# Patient Record
(demographics unavailable — no encounter records)

---

## 2024-10-24 NOTE — REASON FOR VISIT
"Pt was walking dogs, fell on ice, hit back of head \"hard, hard, hard\".  No LOC.  Mild nausea.  Feels a \"little sleepy\". Pain 2/10.  Denies vision change. NAD. Denies neck pain.   " [_______] : [unfilled]

## 2024-10-26 NOTE — HISTORY OF PRESENT ILLNESS
[FreeTextEntry1] : The patient returned to the office stating that her toenails were hurting.  She stated that they feel better after being treated here.  The patient said it is sore on toes 1, 2, 3, 4 and 5 both feet.  The application of the medication was being done.  I adjusted her ankle brace by the fifth metatarsal head.

## 2024-10-26 NOTE — ASSESSMENT
[FreeTextEntry1] : Assessment: Onychomycosis caused by T. Rubrum.  Plan: I debrided each toenail via mechanical trimming and electrical grinding.  All toenails were trimmed with a 14 cm sterile stainless steel box lock double spring nail splitter.  Then utilizing a sterile pear shaped lilia ed (this device falls under bur, surgical, general & plastic surgery.  The FDA deems this item a Class-1 device) via a 35,000 RPM electric drill and vacuum and dust extractor system all toenails were aseptically debrided removing fungal layers.  This is done to diminish the fungal load of the toenails and enhance the effects of the antifungal medication, allowing overall improvement in the degree of fungal infection as well as improve appearance and reduce discomfort and help diminish chances of secondary bacterial infection, also lessening the chance of ingrown nails, especially when performed on a regular basis.  The patient was encouraged to continue with the topical antifungal medication everyday and use the Clean Sweep antifungal spray to disinfect their shoes. She was encouraged to call the office with any questions or problems as they may arise.  PTR: 2 months.

## 2024-12-04 NOTE — PROCEDURE
[de-identified] : I injected the patient's left knee with (Euflexxa) (Lot #[id number] )(exp 05/0625) for osteoarthritis Knee injection visco-supplementation: I discussed at length with the patient the planned steroid and lidocaine injection for primary osteoarthritis. The risks, benefits, convalescence and alternatives were reviewed and pt consented for injection. The possible side effects discussed included but were not limited to: pain, swelling, heat and redness. There symptoms are generally mild but if they are extensive then contact the office. Giving pain relievers by mouth such as NSAIDs or Tylenol can generally treat the reactions to injection. Rare cases of infection have been noted. Rash, hives and itching may occur post injection. If you have muscle pain or cramps, flushing and or swelling of the face, rapid heart beat, nausea, dizziness, fever, chills, headache, difficulty breathing, swelling in the arms or legs, or have a prickly feeling of your skin, contact a health care provider immediately. Following this discussion, the knee was prepped with alcohol and under sterile condition the injection was performed through a superolateral injection site with a 20 gauge needle. The needle was introduced into the joint, aspiration was performed to ensure intra-articular placement and the medication was injected. Upon withdrawal of the needle the site was cleaned with alcohol and a band aid applied. The patient tolerated the injection well and there were no adverse effects. Post injection instructions included no strenuous activity for 24 hours, cryotherapy and if there are any adverse effects to contact the office.  I injected the patient's right knee with (Euflexxa) (Lot #[id number] )(exp 05/0625) for osteoarthritis Knee injection visco-supplementation: I discussed at length with the patient the planned steroid and lidocaine injection for primary osteoarthritis. The risks, benefits, convalescence and alternatives were reviewed and pt consented for injection. The possible side effects discussed included but were not limited to: pain, swelling, heat and redness. There symptoms are generally mild but if they are extensive then contact the office. Giving pain relievers by mouth such as NSAIDs or Tylenol can generally treat the reactions to injection. Rare cases of infection have been noted. Rash, hives and itching may occur post injection. If you have muscle pain or cramps, flushing and or swelling of the face, rapid heart beat, nausea, dizziness, fever, chills, headache, difficulty breathing, swelling in the arms or legs, or have a prickly feeling of your skin, contact a health care provider immediately. Following this discussion, the knee was prepped with alcohol and under sterile condition the injection was performed through a superolateral injection site with a 20 gauge needle. The needle was introduced into the joint, aspiration was performed to ensure intra-articular placement and the medication was injected. Upon withdrawal of the needle the site was cleaned with alcohol and a band aid applied. The patient tolerated the injection well and there were no adverse effects. Post injection instructions included no strenuous activity for 24 hours, cryotherapy and if there are any adverse effects to contact the office.

## 2024-12-04 NOTE — PHYSICAL EXAM
[de-identified] : GENERAL APPEARANCE: Well nourished and hydrated, pleasant, alert, and oriented x 3. Appears their stated age. \par  HEENT: Normocephalic, extraocular eye motion intact. Nasal septum midline. Oral cavity clear. External auditory canal clear. \par  RESPIRATORY: Breath sounds clear and audible in all lobes. No wheezing, No accessory muscle use.\par  CARDIOVASCULAR: No apparent abnormalities. No lower leg edema. No varicosities. Pedal pulses are palpable.\par  NEUROLOGIC: Sensation is normal, no muscle weakness in the upper or lower extremities.\par  DERMATOLOGIC: No apparent skin lesions, moist, warm, no rash.\par  SPINE: Cervical spine appears normal and moves freely; thoracic spine appears normal and moves freely; lumbosacral spine appears normal and moves freely, normal, nontender.\par  MUSCULOSKELETAL: Hands, wrists, and elbows are normal and move freely, shoulders are normal and move freely. \par  Musculoskeletal\par  5/5 motor strength in bilateral lower extremities. Sensory: Intact in bilateral lower extremities. DTRs: Biceps, brachioradialis, triceps, patellar, ankle and plantar 2+ and symmetric bilaterally. Pulses: dorsalis pedis, posterior tibial, femoral, popliteal, and radial 2+ and symmetric bilaterally. \par  Constitutional: Alert and in no acute distress, but well-appearing. \par   [de-identified] : Bilateral knee examination shows a mild effusion she has no open wound range of motion is 0 to 120 degree with crepitus\par

## 2024-12-04 NOTE — DISCUSSION/SUMMARY
[de-identified] : Medication risks reviewed. 77 y/o F pt presents with moderate tricompartmental osteoarthritis of both knees. I discussed the nature of her condition and treatment options. The pt is not a candidate for a bilateral TKA. The pt's most recent MRI of both knees reveal full-thickness cartilage loss in the left knee specifically the patellofemoral joint the right knee is more reassuring MRI with a degenerative medial meniscus tear. I discussed exhausting conservative treatment such as cortisone injections, HA injections, PT, anti-inflammatories, and low impact exercise. The pt responded well to her last HA injections in December 2023. The pt is here for 1/3 Bilateral knee Euflexxa injections which she tolerated well. The pt will f/u in 1 week for 2/3 injection of both knees.

## 2024-12-04 NOTE — REASON FOR VISIT
[Follow-Up Visit] : a follow-up visit for [Other: ____] : [unfilled] [FreeTextEntry2] : Bilat knee Euflexxa #1 Lot [id number] exp 11/30/25

## 2024-12-04 NOTE — HISTORY OF PRESENT ILLNESS
[Pain Location] : pain [Stable] : stable [3] : a current pain level of 3/10 [de-identified] : 75 y/o F pt presents with bilateral knee pain. The pt has a hx of moderate tricompartmental osteoarthritis. The pt was last seen in December 2023 and had HA injections of both knees which gave her good relief. The pt is here for repeat q. 6 months HA injections. Her most recent MRI of both knees shows some areas of full-thickness cartilage loss in the left knee specifically the patellofemoral joint the right knee is more reassuring MRI with a degenerative medial meniscus tear. Pt notes that she is working on weight loss and loss a few pounds recently. She has a mhx of emphysema.     She states the symptoms are worsening. The patient mentions the symptoms started Several year(s) ago. Pain levels include a current pain level of 5/10, an average pain level of 5/10, a minimum pain level of 4/10 and a maximum pain level of 6/10.  Her symptoms occur while sitting. She states the symptoms seem to be constant.    Modifying factors - worsened by bending, worsened with knee flexion and worsened with knee extension. Relieving factors include not relieved by acetaminophen and not relieved by nonsteroidal anti-inflammatory drugs.   Associated Symptoms: no ataxia and no incontinence.

## 2024-12-04 NOTE — END OF VISIT
[FreeTextEntry3] : I, Ho Tapia, acted solely as a scribe for Dr. Cordell Beyer on this date 12/04/2024.  I, Cordell Beyer MD, personally performed the services described in the documentation, reviewed the documentation recorded by the scribe in my presence and it accurately and completely records my words and actions.

## 2024-12-18 NOTE — REASON FOR VISIT
[Follow-Up Visit] : a follow-up visit for [Other: ____] : [unfilled] [FreeTextEntry2] :  Bilat knee Euflexxa #2 Lot [id number] exp 11/30/25.

## 2024-12-18 NOTE — PROCEDURE
[de-identified] : I injected the patient's left knee with (Euflexxa) (Lot #[id number] )(exp 05/0625) for osteoarthritis Knee injection visco-supplementation: I discussed at length with the patient the planned steroid and lidocaine injection for primary osteoarthritis. The risks, benefits, convalescence and alternatives were reviewed and pt consented for injection. The possible side effects discussed included but were not limited to: pain, swelling, heat and redness. There symptoms are generally mild but if they are extensive then contact the office. Giving pain relievers by mouth such as NSAIDs or Tylenol can generally treat the reactions to injection. Rare cases of infection have been noted. Rash, hives and itching may occur post injection. If you have muscle pain or cramps, flushing and or swelling of the face, rapid heart beat, nausea, dizziness, fever, chills, headache, difficulty breathing, swelling in the arms or legs, or have a prickly feeling of your skin, contact a health care provider immediately. Following this discussion, the knee was prepped with alcohol and under sterile condition the injection was performed through a superolateral injection site with a 20 gauge needle. The needle was introduced into the joint, aspiration was performed to ensure intra-articular placement and the medication was injected. Upon withdrawal of the needle the site was cleaned with alcohol and a band aid applied. The patient tolerated the injection well and there were no adverse effects. Post injection instructions included no strenuous activity for 24 hours, cryotherapy and if there are any adverse effects to contact the office.  I injected the patient's right knee with (Euflexxa) (Lot #[id number] )(exp 05/0625) for osteoarthritis Knee injection visco-supplementation: I discussed at length with the patient the planned steroid and lidocaine injection for primary osteoarthritis. The risks, benefits, convalescence and alternatives were reviewed and pt consented for injection. The possible side effects discussed included but were not limited to: pain, swelling, heat and redness. There symptoms are generally mild but if they are extensive then contact the office. Giving pain relievers by mouth such as NSAIDs or Tylenol can generally treat the reactions to injection. Rare cases of infection have been noted. Rash, hives and itching may occur post injection. If you have muscle pain or cramps, flushing and or swelling of the face, rapid heart beat, nausea, dizziness, fever, chills, headache, difficulty breathing, swelling in the arms or legs, or have a prickly feeling of your skin, contact a health care provider immediately. Following this discussion, the knee was prepped with alcohol and under sterile condition the injection was performed through a superolateral injection site with a 20 gauge needle. The needle was introduced into the joint, aspiration was performed to ensure intra-articular placement and the medication was injected. Upon withdrawal of the needle the site was cleaned with alcohol and a band aid applied. The patient tolerated the injection well and there were no adverse effects. Post injection instructions included no strenuous activity for 24 hours, cryotherapy and if there are any adverse effects to contact the office.

## 2024-12-18 NOTE — DISCUSSION/SUMMARY
[de-identified] : Medication risks reviewed. 75 y/o F pt presents with moderate tricompartmental osteoarthritis of both knees. I discussed the nature of her condition and treatment options. The pt is not a candidate for a bilateral TKA. The pt's most recent MRI of both knees reveal full-thickness cartilage loss in the left knee specifically the patellofemoral joint the right knee is more reassuring MRI with a degenerative medial meniscus tear. I discussed exhausting conservative treatment such as cortisone injections, HA injections, PT, anti-inflammatories, and low impact exercise. The pt responded well to her last HA injections in December 2023. The pt is here for 2/3 Bilateral knee Euflexxa injections which she tolerated well. The pt will f/u in 1 week for 3/3 injection of both knees.

## 2024-12-18 NOTE — HISTORY OF PRESENT ILLNESS
[de-identified] : 77 y/o F pt presents with bilateral knee pain. She states her left knee discomfort is more than the right.  The pt has a hx of moderate tricompartmental osteoarthritis. The pt was last seen in December 2023 and had HA injections of both knees which gave her good relief. The pt is here for repeat q. 6 months HA injections. Her most recent MRI of both knees shows some areas of full-thickness cartilage loss in the left knee specifically the patellofemoral joint the right knee is more reassuring MRI with a degenerative medial meniscus tear. Pt notes that she is working on weight loss and loss a few pounds recently. She has a mhx of emphysema.     She states the symptoms are worsening. The patient mentions the symptoms started Several year(s) ago. Pain levels include a current pain level of 5/10, an average pain level of 5/10, a minimum pain level of 4/10 and a maximum pain level of 6/10.  Her symptoms occur while sitting. She states the symptoms seem to be constant.    Modifying factors - worsened by bending, worsened with knee flexion and worsened with knee extension. Relieving factors include not relieved by acetaminophen and not relieved by nonsteroidal anti-inflammatory drugs.   Associated Symptoms: no ataxia and no incontinence. [Pain Location] : pain [Stable] : stable [3] : a current pain level of 3/10

## 2024-12-18 NOTE — END OF VISIT
[FreeTextEntry3] : I, Ho Tapia, acted solely as a scribe for Dr. Cordell Beyer on this date 12/18/2024.  I, Cordell Beyer MD, personally performed the services described in the documentation, reviewed the documentation recorded by the scribe in my presence and it accurately and completely records my words and actions.

## 2024-12-26 NOTE — DISCUSSION/SUMMARY
[de-identified] : Patient had bilateral Euflexxa injection #3 in the office today.  Follow-up 6 months for repeat injections if needed

## 2024-12-26 NOTE — REASON FOR VISIT
[Follow-Up Visit] : a follow-up visit for [Knee Pain] : knee pain [FreeTextEntry2] :  Bilat knee Euflexxa #3 Lot [id number] exp 10/23/25

## 2024-12-26 NOTE — PROCEDURE
[de-identified] :  I injected the patient's * Bilat knee Euflexxa #3 Lot [id number] exp 10/23/25 Knee injection visco-supplementation: I discussed at length with the patient the planned steroid and lidocaine injection for primary osteoarthritis. The risks, benefits, convalescence and alternatives were reviewed and pt consented for injection. The possible side effects discussed included but were not limited to: pain, swelling, heat and redness. There symptoms are generally mild but if they are extensive then contact the office. Giving pain relievers by mouth such as NSAIDs or Tylenol can generally treat the reactions to injection. Rare cases of infection have been noted. Rash, hives and itching may occur post injection. If you have muscle pain or cramps, flushing and or swelling of the face, rapid heart beat, nausea, dizziness, fever, chills, headache, difficulty breathing, swelling in the arms or legs, or have a prickly feeling of your skin, contact a health care provider immediately. Following this discussion, the knee was prepped with alcohol and under sterile condition the injection was performed through a superolateral injection site with a 20 gauge needle. The needle was introduced into the joint, aspiration was performed to ensure intra-articular placement and the medication was injected. Upon withdrawal of the needle the site was cleaned with alcohol and a band aid applied. The patient tolerated the injection well and there were no adverse effects. Post injection instructions included no strenuous activity for 24 hours, cryotherapy and if there are any adverse effects to contact the office.      
clear

## 2025-02-06 NOTE — HISTORY OF PRESENT ILLNESS
[FreeTextEntry1] : The patient returns stating that the previous care of her nails gave her great relief.  She said that the nails after a while became uncomfortable and was irritating adjacent toes on toes 1, 2, 3, 4 and 5 right foot and 1, 2, 3, 4 and 5, left foot.  She said it was hard to walk when her toenails hurt.  She had a new complaint of a painful corn on her left second toe.   She last saw Dr. Powell two weeks ago.

## 2025-02-06 NOTE — PHYSICAL EXAM
[TextEntry] : Toes 1, 2, 3, 4 and 5 right foot and toes 1, 2, 3, 4 and 5 left foot appeared thickened and tender to the touch.  Pain was elicited on palpation, especially in the corners of the toes.  Patient exhibited a facial expression of pain on palpation.  The surrounding nail plates were swollen and erythematous.  The patient exhibited marked limitation of ambulation. I reviewed the ROS and no other changes in podiatric status were observed including dermatological, orthopedic and vascular.  Patient exhibited class findings including diminished dorsalis pedis and posterior tibial pulses, bilat.  Delayed capillary return, bilat.  Diminished or absent hair on the toes of each foot, cool temperatures and shiny skin.  Toenail changes with diminished fat pads may be present.  The left second toe exhibited painful tyloma.

## 2025-02-06 NOTE — ASSESSMENT
[FreeTextEntry1] : Assessment: Onychomycosis caused by T. Rubrum. PVD with one painful lesion.  Plan: Debridement of each toenail on each foot was performed both mechanically and via electrical grinding.  A topical antifungal was used.  All toenails were trimmed with a 14 cm sterile stainless steel box lock double spring nail splitter.  Then utilizing a sterile pear shaped lilia ed (this device falls under bur, surgical, general & plastic surgery.  The FDA deems this item a Class-1 device) via a 35,000 RPM electric drill and vacuum and dust extractor system all toenails were aseptically debrided removing fungal layers.  This is done to diminish the fungal load of the toenails and enhance the effects of the antifungal medication, allowing overall improvement in the degree of fungal infection as well as improve appearance and reduce discomfort and help diminish chances of secondary bacterial infection, also lessening the chance of ingrown nails, especially when performed on a regular basis.  The patient was encouraged to continue with the topical antifungal medication everyday and use the Clean Sweep antifungal spray to disinfect their shoes.  I performed aseptic scalpel debridement of the painful one lesion on the left second toe with a sterile #15 blade on a sterile #3 handle.  PTR: 2 months.

## 2025-05-11 NOTE — HISTORY OF PRESENT ILLNESS
[FreeTextEntry1] : The patient returns stating that the previous care of her nails gave her great relief.  She said that the nails after a while became uncomfortable and was irritating adjacent toes on toes 1, 2, 3, 4 and 5 right foot and 1, 2, 3, 4 and 5, left foot.  She said it was hard to walk when her toenails hurt.  She had a new complaint of pain and swelling on the outside of her right third toe that has been bothering her.  The pain was on and off and now it is more constant.   She states that she was wearing the gel toecaps, but because she was wearing compression stockings, it became too much pressure for the toe, so she stopped wearing them. She has been exercising on a recumbent bike.

## 2025-05-11 NOTE — ASSESSMENT
[FreeTextEntry1] : Assessment: Onychomycosis caused by T. Rubrum. Hammertoe deformity right 3rd toe. Infected ingrown toenail, lateral border, right third toe.  Plan: Debridement of each toenail on each foot was performed both mechanically and via electrical grinding.  A topical antifungal was used.  All toenails were trimmed with a 14 cm sterile stainless steel box lock double spring nail splitter.  Then utilizing a sterile pear shaped lilia ed (this device falls under bur, surgical, general & plastic surgery.  The FDA deems this item a Class-1 device) via a 35,000 RPM electric drill and vacuum and dust extractor system all toenails were aseptically debrided removing fungal layers.  This is done to diminish the fungal load of the toenails and enhance the effects of the antifungal medication, allowing overall improvement in the degree of fungal infection as well as improve appearance and reduce discomfort and help diminish chances of secondary bacterial infection, also lessening the chance of ingrown nails, especially when performed on a regular basis.  The patient was encouraged to continue with the topical antifungal medication everyday and use the Clean Sweep antifungal spray to disinfect their shoes.  Dorsal/plantar and medial oblique weightbearing x-ray views taken of the right foot revealed the third toe to be contracted and medially deviated and plantigrade.  A shortened first metatarsal was also in evidence with an exostosis of the medial base of the distal phalanx on the right first toe.  The patient was instructed about the potential benefits of physical therapy as a conservative means of treating this condition, either independently, or as an adjunct to other conservative treatment.  The patient was instructed that physical therapy is considered therapeutic when performed 2-3 times per week.  Treatment regimens such as, injection therapy, strapping, oral anti-inflammatory medications and the usage of orthotics were all discussed with the patient in order to establish an appropriate treatment plan with the patient's input.  I performed straightback procedures utilizing a 12.7 cm sterile box lock, double spring fine cut back D tip fine tip stainless steel nail splitter removing the medial and lateral borders of both the left and right hallux nails as far back as tolerable and applied Amerigel wound gel with sterile compression dressings. The patient was instructed to start soaking the feet in lukewarm water and Epsom salts, 2 tablespoons per pint for 20 minutes twice per day.  The patient was advised to dry the feet with a clean towel and then apply a sterile dressing to the area for the next 5 days.  The patient is going to see Dr. Collado her vascular specialist.  I discussed wearing possibly compression stockings that have open toes.    I advised the patient to notify the office right away if increased redness, swelling, pain, open wounds or discharge were observed.  PTR: 2 months.

## 2025-05-11 NOTE — PHYSICAL EXAM
[TextEntry] : Toes 1, 2, 3, 4 and 5 right foot and toes 1, 2, 3, 4 and 5 left foot appeared thickened and tender to the touch.  Pain was elicited on palpation, especially in the corners of the toes.  Patient exhibited a facial expression of pain on palpation.  The surrounding nail plates were swollen and erythematous.  The patient exhibited marked limitation of ambulation.  The lateral border of the right third toenail was ingrown.   Surrounding tissue was erythematous, swollen and painful.   The toe was contracted.

## 2025-06-04 NOTE — HISTORY OF PRESENT ILLNESS
[Pain Location] : pain [Stable] : stable [5] : a current pain level of 5/10 [de-identified] : 76 y/o F pt presents with bilateral knee pain. The pt has a hx of moderate tricompartmental osteoarthritis. The pt was last seen in December 2023 and had HA injections of both knees which gave her good relief. The pt is here for repeat q. 6 months HA injections. Her most recent MRI of both knees shows some areas of full-thickness cartilage loss in the left knee specifically the patellofemoral joint the right knee is more reassuring MRI with a degenerative medial meniscus tear. Pt notes that she is working on weight loss and loss a few pounds recently. She has a mhx of emphysema.    She states the symptoms are worsening. The patient mentions the symptoms started Several year(s) ago. Pain levels include a current pain level of 5/10, an average pain level of 5/10, a minimum pain level of 4/10 and a maximum pain level of 6/10.  Her symptoms occur while sitting. She states the symptoms seem to be constant.  Modifying factors - worsened by bending, worsened with knee flexion and worsened with knee extension. Relieving factors include not relieved by acetaminophen and not relieved by nonsteroidal anti-inflammatory drugs. Associated Symptoms: no ataxia and no incontinence.

## 2025-06-04 NOTE — DISCUSSION/SUMMARY
[de-identified] : Medication risks reviewed. 77 y/o F pt presents with moderate tricompartmental osteoarthritis of both knees. I discussed the nature of her condition and treatment options. The pt is not a candidate for a bilateral TKA. The pt's most recent MRI of both knees reveal full-thickness cartilage loss in the left knee specifically the patellofemoral joint the right knee is more reassuring MRI with a degenerative medial meniscus tear. I discussed exhausting conservative treatment such as cortisone injections, HA injections, PT, anti-inflammatories, and low impact exercise. The pt responded well to her last HA injections in December 2023. The pt is here for 1/3 Bilateral knee Euflexxa injections which she tolerated well. The pt will f/u in 1 week for 2/3 injection of both knees.

## 2025-06-04 NOTE — PHYSICAL EXAM
[de-identified] : GENERAL APPEARANCE: Well nourished and hydrated, pleasant, alert, and oriented x 3. Appears their stated age. HEENT: Normocephalic, extraocular eye motion intact. Nasal septum midline. Oral cavity clear. External auditory canal clear. RESPIRATORY: Breath sounds clear and audible in all lobes. No wheezing, No accessory muscle use. CARDIOVASCULAR: No apparent abnormalities. No lower leg edema. No varicosities. Pedal pulses are palpable. NEUROLOGIC: Sensation is normal, no muscle weakness in the upper or lower extremities. DERMATOLOGIC: No apparent skin lesions, moist, warm, no rash. SPINE: Cervical spine appears normal and moves freely; thoracic spine appears normal and moves freely; lumbosacral spine appears normal and moves freely, normal, nontender. MUSCULOSKELETAL: Hands, wrists, and elbows are normal and move freely, shoulders are normal and move freely. Musculoskeletal 5/5 motor strength in bilateral lower extremities. Sensory: Intact in bilateral lower extremities. DTRs: Biceps, brachioradialis, triceps, patellar, ankle and plantar 2+ and symmetric bilaterally. Pulses: dorsalis pedis, posterior tibial, femoral, popliteal, and radial 2+ and symmetric bilaterally. Constitutional: Alert and in no acute distress, but well-appearing.   Musculoskeletal:. Bilateral knee examination shows a mild effusion she has no open wound range of motion is 0 to 120 degree with crepitus.

## 2025-06-04 NOTE — PROCEDURE
[de-identified] : Pt received b/l knee 1/3 Euflexxa  injection for o.a  Lot # [id number] exp 2026-05-05 Knee injection viscosupplementation: I discussed at length with the patient the planned H.A injection for primary osteoarthritis. The risks, benefits, convalescence and alternatives were reviewed and pt consented for injection. The possible side effects discussed included but were not limited to: pain, swelling, heat, stiffness and fullness. There symptoms are generally mild but if they are extensive then contact the office. Giving pain relievers by mouth such as NSAID's or Tylenol can generally treat the reactions to injection. Rare cases of infection have been noted. Rash, hives and itching may occur post injection. If you have muscle pain or cramps, flushing and or swelling of the face, rapid heart beat, nausea, dizziness, fever, chills, headache, difficulty breathing, swelling in the arms or legs, or have a prickly feeling of your skin, contact a health care provider immediately. Following this discussion, the knee was prepped with alcohol and under sterile condition the injection was performed through a superolateral injection site with a 20 gauge needle. The needle was introduced into the joint, aspiration was performed to ensure intra-articular placement and the medication was injected. Upon withdrawal of the needle the site was cleaned with alcohol and a band aid applied. The patient tolerated the injection well and there were no adverse effects. Post injection instructions included no strenuous activity for 24 hours, cryotherapy and if there are any adverse effects to contact the office.

## 2025-06-11 NOTE — REASON FOR VISIT
[Follow-Up Visit] : a follow-up visit for [Other: ____] : [unfilled] [FreeTextEntry2] : bilat knee Euflexxa #2Lot # [id number] exp 2026-05-05

## 2025-06-11 NOTE — PROCEDURE
[de-identified] :  I injected the patient's bilat knee Euflexxa #2Lot # [id number] exp 2026-05-05 Knee injection visco-supplementation: I discussed at length with the patient the planned steroid and lidocaine injection for primary osteoarthritis. The risks, benefits, convalescence and alternatives were reviewed and pt consented for injection. The possible side effects discussed included but were not limited to: pain, swelling, heat and redness. There symptoms are generally mild but if they are extensive then contact the office. Giving pain relievers by mouth such as NSAIDs or Tylenol can generally treat the reactions to injection. Rare cases of infection have been noted. Rash, hives and itching may occur post injection. If you have muscle pain or cramps, flushing and or swelling of the face, rapid heart beat, nausea, dizziness, fever, chills, headache, difficulty breathing, swelling in the arms or legs, or have a prickly feeling of your skin, contact a health care provider immediately. Following this discussion, the knee was prepped with alcohol and under sterile condition the injection was performed through a superolateral injection site with a 20 gauge needle. The needle was introduced into the joint, aspiration was performed to ensure intra-articular placement and the medication was injected. Upon withdrawal of the needle the site was cleaned with alcohol and a band aid applied. The patient tolerated the injection well and there were no adverse effects. Post injection instructions included no strenuous activity for 24 hours, cryotherapy and if there are any adverse effects to contact the office.

## 2025-06-18 NOTE — DISCUSSION/SUMMARY
[de-identified] : patient had bilatearl knee euflexxa injection #3 today which she tolerated welll. follow up 6-7 months for repeat injection if needed

## 2025-06-18 NOTE — REASON FOR VISIT
[Follow-Up Visit] : a follow-up visit for [Other: ____] : [unfilled] [FreeTextEntry2] : bilat knee Euflexxa #3 Lot # [id number] exp 2026-05-05.

## 2025-07-20 NOTE — HISTORY OF PRESENT ILLNESS
[FreeTextEntry1] : The patient returned to the office with a chief complaint of toenails that were difficult to cut and causing pain on toes 1, 2, 3, 4, 5 right foot and 1, 2, 3, 4, and 5 left foot.  She said that certain shoes aggravate her toes more than others. She stated that great relief is achieved from these treatments and without such care they would cause her additional pain, causing marked limitation of walking.  She complained of a painful corn on her right second toe.  She last saw Dr. Powell in May 2025.    She states that she does not have pain on the right fourth toe anymore and she is wearing a gel toe cap and soaking her feet.   She got wider shoes.

## 2025-07-20 NOTE — ASSESSMENT
[FreeTextEntry1] : Assessment: Onychomycosis caused by T. Rubrum. PVD with one painful corn.  Plan: All toenails were debrided mechanically and via electric grinding.  All toenails were trimmed with a 14 cm sterile stainless steel box lock double spring nail splitter.  Then utilizing a sterile pear shaped lilia ed (this device falls under bur, surgical, general & plastic surgery.  The FDA deems this item a Class-1 device) via a 35,000 RPM electric drill and vacuum and dust extractor system all toenails were aseptically debrided removing fungal layers.  This is done to diminish the fungal load of the toenails and enhance the effects of the antifungal medication, allowing overall improvement in the degree of fungal infection as well as improve appearance and reduce discomfort and help diminish chances of secondary bacterial infection, also lessening the chance of ingrown nails, especially when performed on a regular basis.  The patient was encouraged to continue with the topical antifungal medication everyday and use the Clean Sweep antifungal spray to disinfect their shoes.  I performed aseptic scalpel debridement of the one painful lesion on the right second toe with a sterile #15 blade on a sterile #3 handle.  PTR: 2 months.